# Patient Record
Sex: MALE | Race: WHITE | URBAN - METROPOLITAN AREA
[De-identification: names, ages, dates, MRNs, and addresses within clinical notes are randomized per-mention and may not be internally consistent; named-entity substitution may affect disease eponyms.]

---

## 2021-07-07 ENCOUNTER — OFFICE (OUTPATIENT)
Dept: URBAN - METROPOLITAN AREA CLINIC 79 | Facility: CLINIC | Age: 76
End: 2021-07-07
Payer: MEDICARE

## 2021-07-07 VITALS
HEIGHT: 66 IN | WEIGHT: 272 LBS | HEART RATE: 62 BPM | SYSTOLIC BLOOD PRESSURE: 142 MMHG | DIASTOLIC BLOOD PRESSURE: 87 MMHG | TEMPERATURE: 96.6 F

## 2021-07-07 DIAGNOSIS — Z95.2 PRESENCE OF PROSTHETIC HEART VALVE: ICD-10-CM

## 2021-07-07 DIAGNOSIS — R74.8 ABNORMAL LEVELS OF OTHER SERUM ENZYMES: ICD-10-CM

## 2021-07-07 DIAGNOSIS — Z12.11 ENCOUNTER FOR SCREENING FOR MALIGNANT NEOPLASM OF COLON: ICD-10-CM

## 2021-07-07 PROCEDURE — 99244 OFF/OP CNSLTJ NEW/EST MOD 40: CPT | Performed by: PHYSICIAN ASSISTANT

## 2021-07-07 PROCEDURE — 99204 OFFICE O/P NEW MOD 45 MIN: CPT | Performed by: PHYSICIAN ASSISTANT

## 2021-07-07 NOTE — SERVICEHPINOTES
75 yo male presents for consultation at the request of Dr. Arrieta. Patient had labs in May with mild liver enzyme elevation upon 2 different labs. His ALT has ranged from 37 to 52 on review of labs from 0581-0291. He denies any medication changes. He started a Super Beet supplement after he knew about his liver enzymes. He drinks some alcohol on the weekends. Denies excessive Tylenol use. He normally exercises 3x/week. He is wanting to lose weight but hasn't had much success with this. Currently eating 2 meals a day. No other concerns. He has never had a colonoscopy. He reports a daily BM, no blood in stools.  Patient has h/o heart valve replacement in 2005. No h/o CAD.5/28/21 AST/ALT 64/66BR5/7/21 ALT 56

## 2021-09-22 ENCOUNTER — OFFICE (OUTPATIENT)
Dept: URBAN - METROPOLITAN AREA CLINIC 79 | Facility: CLINIC | Age: 76
End: 2021-09-22

## 2021-09-22 ENCOUNTER — OFFICE (OUTPATIENT)
Dept: URBAN - METROPOLITAN AREA CLINIC 79 | Facility: CLINIC | Age: 76
End: 2021-09-22
Payer: COMMERCIAL

## 2021-09-22 VITALS
DIASTOLIC BLOOD PRESSURE: 80 MMHG | SYSTOLIC BLOOD PRESSURE: 116 MMHG | WEIGHT: 268 LBS | HEART RATE: 69 BPM | TEMPERATURE: 96.2 F | HEIGHT: 66 IN

## 2021-09-22 DIAGNOSIS — Z95.2 PRESENCE OF PROSTHETIC HEART VALVE: ICD-10-CM

## 2021-09-22 DIAGNOSIS — R74.8 ABNORMAL LEVELS OF OTHER SERUM ENZYMES: ICD-10-CM

## 2021-09-22 DIAGNOSIS — Z12.11 ENCOUNTER FOR SCREENING FOR MALIGNANT NEOPLASM OF COLON: ICD-10-CM

## 2021-09-22 PROCEDURE — 99213 OFFICE O/P EST LOW 20 MIN: CPT | Performed by: PHYSICIAN ASSISTANT

## 2021-09-22 PROCEDURE — 00031: CPT | Performed by: INTERNAL MEDICINE

## 2021-09-22 NOTE — SERVICEHPINOTES
77 yo male presents for f/u elevated liver enzymes. His lab evaluation was most significant for diabetes which he had not previously had diagnosed. He was advised to see PCP about this but has not done so. He exercises 3x/week. Eats only one meal a day. br sergio He is now ready to schedule a colonoscopy. He reports a daily BM, no blood in stools. sergio
brPrior hx: Patient had labs in May with mild liver enzyme elevation upon 2 different labs. His ALT has ranged from 37 to 52 on review of labs from [id number]. He denies any medication changes. He started a Super Beet supplement after he knew about his liver enzymes. He drinks some alcohol on the weekends. Denies excessive Tylenol use. 
br brPatient has h/o heart valve replacement in 2005. No h/o CAD. Reports doing well. Exercises regularlybr
br 7/8/21 plt 212, glucose 215, AST/ALT 32/43, HgbA1C 8.8, HBsAg neg, iron sat 43, INR 1.0, ASMA neg, BRENNAN neg, TSH 2.93, A1AT wnl, ferritin 286, Hep C ab negbr5/28/21 AST/ALT 64/66br5/7/21 ALT 56

## 2021-11-02 ENCOUNTER — AMBULATORY SURGICAL CENTER (OUTPATIENT)
Dept: URBAN - METROPOLITAN AREA SURGERY 23 | Facility: SURGERY | Age: 76
End: 2021-11-02
Payer: COMMERCIAL

## 2021-11-02 DIAGNOSIS — K63.5 POLYP OF COLON: ICD-10-CM

## 2021-11-02 DIAGNOSIS — Z12.11 ENCOUNTER FOR SCREENING FOR MALIGNANT NEOPLASM OF COLON: ICD-10-CM

## 2021-11-02 PROCEDURE — 45385 COLONOSCOPY W/LESION REMOVAL: CPT | Mod: PT | Performed by: INTERNAL MEDICINE
